# Patient Record
Sex: MALE | Race: WHITE | NOT HISPANIC OR LATINO | Employment: OTHER | URBAN - METROPOLITAN AREA
[De-identification: names, ages, dates, MRNs, and addresses within clinical notes are randomized per-mention and may not be internally consistent; named-entity substitution may affect disease eponyms.]

---

## 2020-02-07 ENCOUNTER — HOSPITAL ENCOUNTER (EMERGENCY)
Facility: HOSPITAL | Age: 75
Discharge: HOME | End: 2020-02-07
Attending: EMERGENCY MEDICINE
Payer: MEDICARE

## 2020-02-07 VITALS
HEIGHT: 72 IN | DIASTOLIC BLOOD PRESSURE: 86 MMHG | TEMPERATURE: 98 F | HEART RATE: 67 BPM | WEIGHT: 155 LBS | RESPIRATION RATE: 18 BRPM | OXYGEN SATURATION: 96 % | BODY MASS INDEX: 20.99 KG/M2 | SYSTOLIC BLOOD PRESSURE: 138 MMHG

## 2020-02-07 DIAGNOSIS — S61.411A LACERATION OF RIGHT HAND WITHOUT FOREIGN BODY, INITIAL ENCOUNTER: ICD-10-CM
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
## 2020-02-07 DIAGNOSIS — S00.83XA CONTUSION OF OTHER PART OF HEAD, INITIAL ENCOUNTER: ICD-10-CM

## 2020-02-07 PROCEDURE — 99284 EMERGENCY DEPT VISIT MOD MDM: CPT | Mod: 25,,, | Performed by: PHYSICIAN ASSISTANT

## 2020-02-07 PROCEDURE — 12002 RPR S/N/AX/GEN/TRNK2.6-7.5CM: CPT

## 2020-02-07 PROCEDURE — 99284 PR EMERGENCY DEPT VISIT,LEVEL IV: ICD-10-PCS | Mod: 25,,, | Performed by: PHYSICIAN ASSISTANT

## 2020-02-07 PROCEDURE — 25000003 PHARM REV CODE 250: Performed by: PHYSICIAN ASSISTANT

## 2020-02-07 PROCEDURE — 12002 RPR S/N/AX/GEN/TRNK2.6-7.5CM: CPT | Mod: RT,,, | Performed by: PHYSICIAN ASSISTANT

## 2020-02-07 PROCEDURE — 25000003 PHARM REV CODE 250: Performed by: EMERGENCY MEDICINE

## 2020-02-07 PROCEDURE — 99283 EMERGENCY DEPT VISIT LOW MDM: CPT | Mod: 25

## 2020-02-07 PROCEDURE — 12002 PR RESUP NPTERF WND BODY 2.6-7.5 CM: ICD-10-PCS | Mod: RT,,, | Performed by: PHYSICIAN ASSISTANT

## 2020-02-07 RX ORDER — TAMSULOSIN HYDROCHLORIDE 0.4 MG/1
0.4 CAPSULE ORAL DAILY
COMMUNITY

## 2020-02-07 RX ORDER — ACETAMINOPHEN 325 MG/1
650 TABLET ORAL
Status: COMPLETED | OUTPATIENT
Start: 2020-02-07 | End: 2020-02-07

## 2020-02-07 RX ORDER — AMLODIPINE AND VALSARTAN 10; 320 MG/1; MG/1
1 TABLET ORAL DAILY
COMMUNITY

## 2020-02-07 RX ORDER — SERTRALINE HYDROCHLORIDE 100 MG/1
100 TABLET, FILM COATED ORAL DAILY
COMMUNITY

## 2020-02-07 RX ORDER — FAMOTIDINE 10 MG/1
10 TABLET ORAL 2 TIMES DAILY
COMMUNITY

## 2020-02-07 RX ORDER — CEPHALEXIN 500 MG/1
500 CAPSULE ORAL EVERY 12 HOURS
Qty: 10 CAPSULE | Refills: 0 | Status: SHIPPED | OUTPATIENT
Start: 2020-02-07 | End: 2020-02-12

## 2020-02-07 RX ORDER — LIDOCAINE HYDROCHLORIDE 10 MG/ML
10 INJECTION INFILTRATION; PERINEURAL
Status: COMPLETED | OUTPATIENT
Start: 2020-02-07 | End: 2020-02-07

## 2020-02-07 RX ORDER — ACETAMINOPHEN 500 MG
1000 TABLET ORAL EVERY 8 HOURS
Refills: 0 | COMMUNITY
Start: 2020-02-07 | End: 2020-02-14

## 2020-02-07 RX ORDER — LANOLIN ALCOHOL/MO/W.PET/CERES
100 CREAM (GRAM) TOPICAL DAILY
COMMUNITY

## 2020-02-07 RX ORDER — OMEPRAZOLE 20 MG/1
20 CAPSULE, DELAYED RELEASE ORAL DAILY
COMMUNITY

## 2020-02-07 RX ORDER — AMLODIPINE BESYLATE 10 MG/1
10 TABLET ORAL DAILY
COMMUNITY
End: 2020-02-07 | Stop reason: CLARIF

## 2020-02-07 RX ADMIN — ACETAMINOPHEN 650 MG: 325 TABLET ORAL at 11:02

## 2020-02-07 RX ADMIN — LIDOCAINE HYDROCHLORIDE 10 ML: 10 INJECTION, SOLUTION INFILTRATION; PERINEURAL at 11:02

## 2020-02-07 RX ADMIN — BACITRACIN ZINC AND POLYMYXIN B SULFATE: at 02:02

## 2020-02-07 NOTE — ED NOTES
Bilateral palm skin tears  Irrigated w/ sterile NS and  mepalex applied to left palm and  sterile gauze w/ koban to rt - waiting for  rtepair per PA

## 2020-02-07 NOTE — ED NOTES
Patient identifiers for Rigo Hughes 74 y.o. male checked and correct.  Chief Complaint   Patient presents with    Fall     tripped over a sidewalk, fell- states caught himself with his hands, but also struck right forehead/ denies loss of consciousness--- denies blood thinners-- bilateral hand abrasions/right wrist pain     No past medical history on file.  Allergies reported: Review of patient's allergies indicates:  Allergies not on file      LOC: Patient is awake, alert, and aware of environment with an appropriate affect. Patient is oriented x 4 and speaking appropriately.  APPEARANCE: Patient resting comfortably and in no acute distress. Patient is clean and well groomed, patient's clothing is properly fastened.  HEENT: Wears glasses.  SKIN: The skin is warm and dry. Patient has normal skin turgor and moist mucus membranes. Right hand skin tear to palm. States pain to right wrist, rating 6/10. Skin abrasion to left palm as well. Bleeding controlled.   MUSKULOSKELETAL: Patient is moving all extremities well, no obvious deformities noted. Pulses intact. Patient tripped on the sidewalk about an hour ago. Hit his right forehead; abrasions noted. Ambulatory by self.  RESPIRATORY: Airway is open and patent. Respirations are spontaneous and non-labored with normal effort and rate. Denies SOB.   CARDIAC: Patient has a normal rate and rhythm. No peripheral edema noted. Denies chest pain.  ABDOMEN: No distention noted. Soft and non-tender upon palpation. Denies nausea and vomiting.   NEUROLOGICAL: PERRL. Facial expression is symmetrical. Hand grasps are equal bilaterally. Normal sensation in all extremities when touched with finger. Denies headache.

## 2020-02-07 NOTE — ED PROVIDER NOTES
Encounter Date: 2/7/2020       History     Chief Complaint   Patient presents with    Fall     tripped over a sidewalk, fell- states caught himself with his hands, but also struck right forehead/ denies loss of consciousness--- denies blood thinners-- bilateral hand abrasions/right wrist pain     75 y/o male with history of HTN, GERD,  presents to the ER with chief complaint of right hand wound and pain since a fall at 10:30 this morning. The patient is from [place] and was walking to the street car, when he tripped over an uneven portion on the ground.  He reports hitting the right side of forehead on the ground.  He bent his glasses, but denies laceration to the head.  He denies LOC, dizziness, nausea, vomiting, or headache, neck or back or back.  He has skin tears to bilateral palms, right knee abrasion, and small abrasion to right eyebrow. He denies knee pain or difficulty walking since the fall.      Patients last tetanus immunization was <5 years ago after a fall.         Review of patient's allergies indicates:  No Known Allergies  Past Medical History:   Diagnosis Date    Depression     GERD (gastroesophageal reflux disease)     Hypertension      History reviewed. No pertinent surgical history.  History reviewed. No pertinent family history.  Social History     Tobacco Use    Smoking status: Never Smoker    Smokeless tobacco: Never Used   Substance Use Topics    Alcohol use: Not Currently    Drug use: Never     Review of Systems   Constitutional: Negative for chills and fever.   HENT: Negative for sore throat.    Eyes: Negative for visual disturbance.   Respiratory: Negative for shortness of breath.    Cardiovascular: Negative for chest pain.   Gastrointestinal: Negative for nausea and vomiting.   Genitourinary: Negative for dysuria.   Musculoskeletal: Negative for back pain, joint swelling and neck pain.   Skin: Positive for wound. Negative for rash.   Neurological: Negative for dizziness,  syncope, weakness and headaches.   Hematological: Does not bruise/bleed easily.   Psychiatric/Behavioral: Negative for confusion.       Physical Exam     Initial Vitals [02/07/20 1048]   BP Pulse Resp Temp SpO2   (!) 186/92 77 16 97.7 °F (36.5 °C) 98 %      MAP       --         Physical Exam    Nursing note and vitals reviewed.  Constitutional: He appears well-developed and well-nourished.   HENT:   Head: Head is with abrasion (2 small abrasions above right eyebrow) and with contusion (hematoma right forehead.  ).   Mouth/Throat: Oropharynx is clear and moist.   No nasal bony tenderness or orbital bony tenderness.    Eyes: Conjunctivae and EOM are normal. Pupils are equal, round, and reactive to light. Right conjunctiva is not injected. Right conjunctiva has no hemorrhage. Left conjunctiva is not injected. Left conjunctiva has no hemorrhage.   Neck: Normal range of motion. Neck supple.   Cardiovascular: Normal rate and regular rhythm.   Pulmonary/Chest: Breath sounds normal. No respiratory distress. He has no wheezes. He has no rhonchi. He has no rales.   Abdominal: Soft. Bowel sounds are normal. There is no tenderness.   Musculoskeletal:        Right wrist: He exhibits normal range of motion, no tenderness (no bony tenderness), no bony tenderness and no swelling.        Left wrist: He exhibits normal range of motion, no bony tenderness and no swelling.        Right knee: He exhibits normal range of motion, no ecchymosis (1 cm superficial abrasion to right knee) and no bony tenderness. No tenderness found.        Right hand: He exhibits laceration. He exhibits no bony tenderness.        Left hand: He exhibits no bony tenderness.        Hands:  Neurological: He is alert and oriented to person, place, and time.   Skin: Capillary refill takes less than 2 seconds. No rash noted.   Psychiatric: He has a normal mood and affect.         ED Course   Lac Repair  Date/Time: 2/7/2020 1:51 PM  Performed by: Maria R Leslie,  PA  Authorized by: Hernandez Gonzalez DO   Body area: upper extremity  Location details: right hand  Laceration length: 4 cm  Foreign bodies: no foreign bodies  Tendon involvement: none  Nerve involvement: none  Anesthesia: local infiltration    Anesthesia:  Local anesthesia used: yes  Local Anesthetic: lidocaine 1% without epinephrine  Irrigation solution: saline  Irrigation method: syringe  Amount of cleaning: extensive  Debridement: none  Degree of undermining: none  Skin closure: 4-0 nylon  Number of sutures: 6  Technique: simple  Approximation: close  Approximation difficulty: complex  Dressing: Steri-Strips and antibiotic ointment  Patient tolerance: Patient tolerated the procedure well with no immediate complications        Labs Reviewed - No data to display       Imaging Results          CT Head Without Contrast (Final result)  Result time 02/07/20 12:36:26    Final result by Vamshi Burrows MD (02/07/20 12:36:26)                 Impression:      Mild right frontal extracranial soft tissue swelling without evidence of underlying fracture or acute intracranial hemorrhage.    Patchy lobulated mucosal thickening in the paranasal sinuses.      Electronically signed by: Vamshi Burrows MD  Date:    02/07/2020  Time:    12:36             Narrative:    EXAMINATION:  CT HEAD WITHOUT CONTRAST    CLINICAL HISTORY:  Superficial injury of head;    TECHNIQUE:  Low dose axial CT images obtained throughout the head without the use of intravenous contrast.  Axial, sagittal and coronal reconstructions were performed.    COMPARISON:  None.    FINDINGS:  Intracranial compartment:    No evidence of hydrocephalus.    The brain parenchyma appears within normal limits for age.  No parenchymal mass, hemorrhage, edema or major vascular distribution infarct.    No extra-axial blood or fluid collections.    Skull/extracranial contents (limited evaluation):    Mild right frontal extracranial soft tissue swelling.  No subjacent calvarial  fracture.    Mastoid air cells are clear. Patchy lobulated mucosal thickening in the paranasal sinuses.                                       APC / Resident Notes:   Patient presents to the ER with chief complaint of right hand wound since a mechanical fall just prior to arrival.  Patient also has contusion to the right forehead and small abrasion to the right knee.  He is ambulating without difficulty.  Will perform CT of head, clean wounds and repair right hand laceration.  Patient is neuro intact.    CT reveals frontal soft tissue swelling consistent with exam, no underlying fracture or acute intracranial hemorrhage. Patient does not have bony tenderness to the wrist or hands and I do not suspect fracture.  Patient is up-to-date on tetanus immunization as he states he had this within the last 5 years.  Wound is irrigated and repaired with 6 sutures.  This is a skin flap and apex of the wound does not approximate fully due to small area of skin avulsion.  Minimal amount of very thin, irregularly shaped skin is removed during  wound repair.  Steri-Strips are placed, antibacterial ointment and nonstick bandage is applied.  Will treat prophylactically with Keflex x5 days.  I have advised patient to return to the ER in 7 days for wound check and suture removal.  Patient is from out of town.  He is given ER return precautions.  I discussed the care this patient with my supervising physician.                            Clinical Impression:       ICD-10-CM ICD-9-CM   1. Fall, initial encounter W19.XXXA E888.9   2. Laceration of right hand without foreign body, initial encounter S61.411A 882.0   3. Contusion of other part of head, initial encounter S00.83XA 920                             NORMAN Low  02/07/20 1357       NORMAN Low  02/07/20 1358

## 2020-02-14 ENCOUNTER — HOSPITAL ENCOUNTER (EMERGENCY)
Facility: HOSPITAL | Age: 75
Discharge: HOME | End: 2020-02-14
Attending: EMERGENCY MEDICINE
Payer: MEDICARE

## 2020-02-14 VITALS
BODY MASS INDEX: 20.99 KG/M2 | HEIGHT: 72 IN | RESPIRATION RATE: 18 BRPM | SYSTOLIC BLOOD PRESSURE: 160 MMHG | DIASTOLIC BLOOD PRESSURE: 81 MMHG | OXYGEN SATURATION: 97 % | WEIGHT: 155 LBS | TEMPERATURE: 98 F | HEART RATE: 60 BPM

## 2020-02-14 DIAGNOSIS — Z48.02 VISIT FOR SUTURE REMOVAL: Primary | ICD-10-CM

## 2020-02-14 PROCEDURE — 99281 EMR DPT VST MAYX REQ PHY/QHP: CPT

## 2020-02-14 PROCEDURE — 99024 POSTOP FOLLOW-UP VISIT: CPT | Mod: ,,, | Performed by: EMERGENCY MEDICINE

## 2020-02-14 PROCEDURE — 99024 PR POST-OP FOLLOW-UP VISIT: ICD-10-PCS | Mod: ,,, | Performed by: EMERGENCY MEDICINE

## 2020-02-14 NOTE — ED NOTES
Patient identifiers verified and correct for Mr Hughes  C/C: Suture removal SEE NN  APPEARANCE: awake and alert in NAD.  SKIN: warm, dry elliptical incision to palm of right hand with no open area,min redness or swelling noted. Abrasion to top of hand near thumb.   MUSCULOSKELETAL: Patient moving all extremities spontaneously, no obvious swelling or deformities noted. Ambulates independently.  RESPIRATORY: Denies shortness of breath.Respirations unlabored.   CARDIAC: Denies CP, 2+ distal pulses; no peripheral edema  ABDOMEN: S/ND/NT, Denies nausea  : voids spontaneously, denies difficulty  Neurologic: AAO x 4; follows commands equal strength in all extremities; denies numbness/tingling. Denies dizziness Denies weakness

## 2020-02-14 NOTE — DISCHARGE INSTRUCTIONS
Keep wound clean.  Do not do any heavy lifting at this time.  Still will take a few weeks until you have full strength of the wound.

## 2020-02-14 NOTE — ED PROVIDER NOTES
Encounter Date: 2/14/2020    SCRIBE #1 NOTE: I, Starla Mckeon, am scribing for, and in the presence of,  Dr. Arguelles. I have scribed the following portions of the note - Other sections scribed: HPI ROS PE.       History     Chief Complaint   Patient presents with    Suture / Staple Removal     r hand     Time patient was seen by the provider: 12:53 PM      The patient is a 74 y.o. male with co-morbidities including: HTN, who presents to the ED for suture removal to his right hand. The patient had 6 sutures placed on the dorsum of his right hand last Friday 2/7. He states he had a trip and fall with skin tears to his hand.     The history is provided by the patient and medical records.     Review of patient's allergies indicates:  No Known Allergies  Past Medical History:   Diagnosis Date    Depression     GERD (gastroesophageal reflux disease)     Hypertension      History reviewed. No pertinent surgical history.  History reviewed. No pertinent family history.  Social History     Tobacco Use    Smoking status: Never Smoker    Smokeless tobacco: Never Used   Substance Use Topics    Alcohol use: Not Currently    Drug use: Never     Review of Systems   Constitutional: Negative for fatigue and fever.   Skin: Positive for wound.       Physical Exam     Initial Vitals [02/14/20 1245]   BP Pulse Resp Temp SpO2   (!) 160/81 60 18 98.4 °F (36.9 °C) 97 %      MAP       --         Physical Exam    Nursing note and vitals reviewed.  Constitutional: He appears well-developed and well-nourished. He is not diaphoretic. No distress.   HENT:   Head: Normocephalic and atraumatic.   Eyes: Conjunctivae and EOM are normal. Pupils are equal, round, and reactive to light.   Neck: Normal range of motion.   Musculoskeletal: Normal range of motion. He exhibits no edema or tenderness.   Neurological: He is alert and oriented to person, place, and time. He has normal strength. No cranial nerve deficit.   Skin: Skin is warm and  dry. Capillary refill takes less than 2 seconds. No rash noted.   Dorsum right hand at the 1st and 2nd metacarpal. Large skin tear 1 cm by 2 cm with no induration and no fluctuance.    Psychiatric: He has a normal mood and affect. His speech is normal.         ED Course   Suture Removal  Date/Time: 2/14/2020 12:55 PM  Performed by: Jerrell Arguelles DO  Authorized by: Jerrell Arguelles DO   Body area: upper extremity  Location details: right hand  Description of findings: Healing granulation tissue. No discharge. No erythema. No wound dehiscence.   Wound Appearance: well healed, no drainage and indurated  Sutures Removed: 6  Complications: No  Patient tolerance: Patient tolerated the procedure well with no immediate complications        Labs Reviewed - No data to display       Imaging Results    None          Medical Decision Making:   History:   Old Medical Records: I decided to obtain old medical records.  Initial Assessment:   74-year-old gentleman here for suture removal.  No signs of infection.  Sutures removed.  Will re-dress and care instructions given.    Patient discharged home in stable condition. Diagnosis and treatment plan explained to patient and/or family member who is at bedside. I have answered all questions and the patient is satisfied with the plan of care. The patient demonstrates understanding of the care plan. This is the extent to the patients complaints today here in the emergency department.            Scribe Attestation:   Scribe #1: I performed the above scribed service and the documentation accurately describes the services I performed. I attest to the accuracy of the note.                          Clinical Impression:       ICD-10-CM ICD-9-CM   1. Visit for suture removal Z48.02 V58.32         Disposition:   Disposition: Discharged  Condition: Stable                     Jerrell Arguelles DO  02/14/20 1309